# Patient Record
Sex: MALE | Race: WHITE | NOT HISPANIC OR LATINO | URBAN - NONMETROPOLITAN AREA
[De-identification: names, ages, dates, MRNs, and addresses within clinical notes are randomized per-mention and may not be internally consistent; named-entity substitution may affect disease eponyms.]

---

## 2019-08-12 ENCOUNTER — APPOINTMENT (RX ONLY)
Dept: URBAN - NONMETROPOLITAN AREA CLINIC 13 | Facility: CLINIC | Age: 69
Setting detail: DERMATOLOGY
End: 2019-08-12

## 2019-08-12 VITALS — HEIGHT: 72 IN | WEIGHT: 175 LBS

## 2019-08-12 DIAGNOSIS — L23.0 ALLERGIC CONTACT DERMATITIS DUE TO METALS: ICD-10-CM

## 2019-08-12 PROCEDURE — ? PATCH TESTING

## 2019-08-12 PROCEDURE — 95044 PATCH/APPLICATION TESTS: CPT

## 2019-08-12 NOTE — PROCEDURE: PATCH TESTING
Post-Care Instructions: I reviewed with the patient in detail post-care instructions. Patient should not sweat, pick at, or get the patches wet for 48 hours.
Detail Level: None
Number Of Patches (Maximum Allowable Per Dos By Cms Is 90): 7
Consent: Written consent obtained, risks reviewed including but not limited to rash, itching, allergic reaction, systemic rash, remote possiblity of anaphylaxis to allergen.

## 2019-08-12 NOTE — HPI: FOLLOW UP OTHER
What Is Your Reason For Requesting A Follow-Up Appointment?: Patient presents for Metal Patch Testing at the request of Dr. Jose F [facility] at [facility].  He had a right knee replacement 3 years ago and occasionally feels a mild burning sensation in the area.  He has not tried treating the area.

## 2019-08-14 ENCOUNTER — APPOINTMENT (RX ONLY)
Dept: URBAN - NONMETROPOLITAN AREA CLINIC 13 | Facility: CLINIC | Age: 69
Setting detail: DERMATOLOGY
End: 2019-08-14

## 2019-08-14 DIAGNOSIS — L23.0 ALLERGIC CONTACT DERMATITIS DUE TO METALS: ICD-10-CM

## 2019-08-14 PROCEDURE — 99212 OFFICE O/P EST SF 10 MIN: CPT

## 2019-08-14 PROCEDURE — ? METALS PATCH TEST READING

## 2019-08-14 NOTE — PROCEDURE: METALS PATCH TEST READING
Allergen 22 Reaction: no reaction
Name Of Allergen 3: [place]
What Reading Time Point?: 48 hour
Name Of Allergen 6: titanium (III) oxalate decahydrate
Show Negative Results In The Note?: Yes
Show Allergen Counseling In The Note?: No
Name Of Allergen 1: Potassium Dichromate
Name Of Allergen 2: Nickel
Name Of Allergen 4: Titanium nitride
Detail Level: Zone
Name Of Allergen 5: titanium oxide
Number Of Patches Read: 7
Name Of Allergen 7: titanium